# Patient Record
Sex: FEMALE | Race: ASIAN | Employment: OTHER | URBAN - METROPOLITAN AREA
[De-identification: names, ages, dates, MRNs, and addresses within clinical notes are randomized per-mention and may not be internally consistent; named-entity substitution may affect disease eponyms.]

---

## 2024-02-24 ENCOUNTER — HOSPITAL ENCOUNTER (OUTPATIENT)
Age: 89
Discharge: HOME | End: 2024-02-24

## 2024-02-24 ENCOUNTER — APPOINTMENT (OUTPATIENT)
Dept: GENERAL RADIOLOGY | Age: 89
End: 2024-02-24
Attending: NURSE PRACTITIONER

## 2024-02-24 VITALS
TEMPERATURE: 97 F | OXYGEN SATURATION: 100 % | DIASTOLIC BLOOD PRESSURE: 49 MMHG | SYSTOLIC BLOOD PRESSURE: 144 MMHG | HEART RATE: 77 BPM | RESPIRATION RATE: 20 BRPM

## 2024-02-24 DIAGNOSIS — J22 LOWER RESPIRATORY TRACT INFECTION: ICD-10-CM

## 2024-02-24 DIAGNOSIS — R05.1 ACUTE COUGH: Primary | ICD-10-CM

## 2024-02-24 LAB — SARS-COV-2 RNA RESP QL NAA+PROBE: NOT DETECTED

## 2024-02-24 PROCEDURE — 99203 OFFICE O/P NEW LOW 30 MIN: CPT | Performed by: NURSE PRACTITIONER

## 2024-02-24 PROCEDURE — 71046 X-RAY EXAM CHEST 2 VIEWS: CPT | Performed by: NURSE PRACTITIONER

## 2024-02-24 PROCEDURE — U0002 COVID-19 LAB TEST NON-CDC: HCPCS | Performed by: NURSE PRACTITIONER

## 2024-02-24 RX ORDER — AZITHROMYCIN 250 MG/1
TABLET, FILM COATED ORAL
Qty: 6 TABLET | Refills: 0 | Status: SHIPPED | OUTPATIENT
Start: 2024-02-24 | End: 2024-02-29

## 2024-02-24 NOTE — ED INITIAL ASSESSMENT (HPI)
Pt c/o productive cough, which started 2/17, after travelling. Chest xray done on 2/19, positive for pneumonia. Unable to obtain prescription. Denies fever

## 2024-02-24 NOTE — ED PROVIDER NOTES
Patient Seen in: [facility]      History     Chief Complaint   Patient presents with    Cough     Stated Complaint: Cough    Subjective:   89-year-old female with type 2 diabetes, hypertension presents from home.  She is accompanied by both of her daughters.  Her daughter is providing the history.  Patient has had a cough for a week.  Some white phlegm.  Symptoms started after traveling to the [facility].  The daughter states she was seen in the [facility] on 2/19 and had x-ray and blood work done.  The chest x-ray showed pneumonia.  The antibiotic prescriptions were not filled because she traveled home to the [facility].  Cough has persisted.  No fever.  No shortness of breath.  Daughter does note that she had wheezing 2 days ago which was isolated.  No COVID testing done at home.  No history of pneumonia.  Non-smoker.    The history is provided by a relative. A  was used (daughters providing history).     Objective:   Past Medical History:   Diagnosis Date    Diabetes (HCC)     Essential hypertension               History reviewed. No pertinent surgical history.             Social History     Socioeconomic History    Marital status:    Tobacco Use    Smoking status: Unknown              Review of Systems    Positive for stated complaint: Cough  Other systems are as noted in HPI.  Constitutional and vital signs reviewed.      All other systems reviewed and negative except as noted above.    Physical Exam     ED Triage Vitals [02/24/24 0952]   /49   Pulse 77   Resp 20   Temp 97.1 °F (36.2 °C)   Temp src Temporal   SpO2 100 %   O2 Device None (Room air)       Current:/49   Pulse 77   Temp 97.1 °F (36.2 °C) (Temporal)   Resp 20   SpO2 100%         Physical Exam  Vitals and nursing note reviewed.   Constitutional:       General: She is not in acute distress.     Appearance: Normal appearance. She is not ill-appearing or toxic-appearing.   HENT:      Head:  Normocephalic and atraumatic.      Nose: Nose normal.      Mouth/Throat:      Mouth: Mucous membranes are moist.      Pharynx: Oropharynx is clear.   Eyes:      Pupils: Pupils are equal, round, and reactive to light.   Cardiovascular:      Rate and Rhythm: Normal rate and regular rhythm.      Pulses: Normal pulses.   Pulmonary:      Effort: Pulmonary effort is normal. No respiratory distress.      Breath sounds: Normal breath sounds.      Comments: No persistent cough noted on exam.  Patient is spitting her saliva into a grocery bag. Lungs clear.  No adventitious lung sounds.  No distress.  No hypoxia.  Pulse ox 100% ra. Which is normal;    Abdominal:      General: Abdomen is flat.      Palpations: Abdomen is soft.   Musculoskeletal:         General: No signs of injury. Normal range of motion.      Cervical back: Normal range of motion and neck supple.   Skin:     General: Skin is warm and dry.      Capillary Refill: Capillary refill takes less than 2 seconds.   Neurological:      General: No focal deficit present.      Mental Status: She is alert and oriented to person, place, and time.      GCS: GCS eye subscore is 4. GCS verbal subscore is 4. GCS motor subscore is 6.      Comments: Confused per norm   Psychiatric:         Mood and Affect: Mood normal.         Behavior: Behavior normal.         Thought Content: Thought content normal.         Judgment: Judgment normal.         ED Course     Labs Reviewed   RAPID SARS-COV-2 BY PCR - Normal     Recent Results (from the past 24 hour(s))   Rapid SARS-CoV-2 by PCR    Collection Time: 02/24/24 10:04 AM    Specimen: Nares; Other   Result Value Ref Range    Rapid SARS-CoV-2 by PCR Not Detected Not Detected     XR CHEST PA + LAT CHEST (CPT=71046)    Result Date: 2/24/2024  CONCLUSION:  1. No evidence of pneumonia. 2. Linear atelectasis and or scar in the mid and lower lungs.  Otherwise negative.    Dictated by (CST): Chemo Jensen MD on 2/24/2024 at 10:38 AM     Finalized  by (CST): Chemo Jensen MD on 2/24/2024 at 10:39 AM           MDM      Medical Decision Making  Cough  Patient with cough for 1 week.  Daughter states chest x-ray done 5 days ago showed pneumonia but no antibiotics were started due to traveling  Differential diagnosis: COVID, pneumonia, other lower respiratory tract infection  COVID testing is negative  Chest x-ray here is negative for pneumonia.  There is some scarring that was discussed with the daughter.  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  Patient is resting  comfortably.  No respiratory distress.  No hypoxia.  Pulse ox 100% room air which is normal.  Lungs are clear.  No wheezing.  No persistent cough.  Shared decision making with the daughter.  No access to previous x-ray that showed pneumonia.  Misread?  Noted scarring?  Unclear if the patient had a bacterial pneumonia.  Given the patient's age.  Possible previous pneumonia.  Persistent cough.  We will treat her with antibiotics for possible lower respiratory tract infection.  Daughter is requesting azithromycin.  They have a nebulizer at home that they will use for the wheezing.  Patient needs close follow-up with her primary doctor.  She goes to the local clinic.  They were encouraged to call on Monday to schedule appointment.  Go to the emergency room if worsening symptoms, shortness of breath, confusion  Results and plan of care discussed with the patient/family. They are in agreement with discharge. They understand to follow up with their primary doctor or the referral physician for further evaluation, especially if no improvement.  Also discussed the limitations of immediate care, patient is aware that if symptoms are worse they should go to the emergency room. Verbal and written discharge instructions were given.       Problems Addressed:  Acute cough: acute illness or injury  Lower respiratory tract infection: acute illness or injury    Amount and/or  Complexity of Data Reviewed  Independent Historian:      Details: Daughter (RN)  External Data Reviewed: labs.     Details: Daughter has copies of outpatient labs that were drawn 2/19.  Creatinine was 2.3 which is chronic.  A1c 6.6.  WBC 11.4.  No copy of chest x-ray results available  Labs: ordered. Decision-making details documented in ED Course.  Radiology: ordered and independent interpretation performed. Decision-making details documented in ED Course.     Details: No pneumonia    Risk  OTC drugs.  Prescription drug management.        Disposition and Plan     Clinical Impression:  1. Acute cough    2. Lower respiratory tract infection         Disposition:  Discharge  2/24/2024 10:55 am    Follow-up:  Lorna Guerra [address], [address]  [facility] [address] [phone]                Medications Prescribed:  Discharge Medication List as of 2/24/2024 11:00 AM        START taking these medications    Details   azithromycin (ZITHROMAX Z-SHIVA) 250 MG Oral Tab 500 mg once followed by 250 mg daily x 4 days, Print, Disp-6 tablet, R-0

## 2024-02-24 NOTE — DISCHARGE INSTRUCTIONS
Give the Z-Jj as directed.  Use the nebulizer as needed for persistent cough or wheezing.  Increase water intake.  Schedule follow-up with Dr. Guerra.  Go to the emergency room if worsening symptoms, shortness of breath, fatigue, fever